# Patient Record
Sex: MALE | Race: WHITE | NOT HISPANIC OR LATINO | URBAN - METROPOLITAN AREA
[De-identification: names, ages, dates, MRNs, and addresses within clinical notes are randomized per-mention and may not be internally consistent; named-entity substitution may affect disease eponyms.]

---

## 2021-01-29 ENCOUNTER — AMBULATORY - HEALTHEAST (OUTPATIENT)
Dept: NURSING | Facility: CLINIC | Age: 26
End: 2021-01-29

## 2021-02-19 ENCOUNTER — AMBULATORY - HEALTHEAST (OUTPATIENT)
Dept: NURSING | Facility: CLINIC | Age: 26
End: 2021-02-19

## 2021-04-20 ASSESSMENT — ENCOUNTER SYMPTOMS
FEVER: 0
HEMATOCHEZIA: 0
PARESTHESIAS: 0
HEADACHES: 0
WEAKNESS: 0
SORE THROAT: 0
COUGH: 0
PALPITATIONS: 0
HEARTBURN: 0
DIZZINESS: 0
CHILLS: 0
ARTHRALGIAS: 0
NAUSEA: 0
FREQUENCY: 0
CONSTIPATION: 0
DIARRHEA: 0
JOINT SWELLING: 0
DYSURIA: 0
EYE PAIN: 0
ABDOMINAL PAIN: 0
HEMATURIA: 0
MYALGIAS: 0
NERVOUS/ANXIOUS: 1
SHORTNESS OF BREATH: 0

## 2021-04-22 ENCOUNTER — OFFICE VISIT (OUTPATIENT)
Dept: FAMILY MEDICINE | Facility: CLINIC | Age: 26
End: 2021-04-22
Payer: COMMERCIAL

## 2021-04-22 VITALS
HEIGHT: 71 IN | DIASTOLIC BLOOD PRESSURE: 68 MMHG | OXYGEN SATURATION: 98 % | RESPIRATION RATE: 14 BRPM | WEIGHT: 184 LBS | BODY MASS INDEX: 25.76 KG/M2 | HEART RATE: 76 BPM | SYSTOLIC BLOOD PRESSURE: 116 MMHG | TEMPERATURE: 97.5 F

## 2021-04-22 DIAGNOSIS — Z23 NEED FOR DIPHTHERIA-TETANUS-PERTUSSIS (TDAP) VACCINE: ICD-10-CM

## 2021-04-22 DIAGNOSIS — Z11.59 NEED FOR HEPATITIS C SCREENING TEST: ICD-10-CM

## 2021-04-22 DIAGNOSIS — Z13.6 CARDIOVASCULAR SCREENING; LDL GOAL LESS THAN 160: ICD-10-CM

## 2021-04-22 DIAGNOSIS — Z00.00 ROUTINE GENERAL MEDICAL EXAMINATION AT A HEALTH CARE FACILITY: Primary | ICD-10-CM

## 2021-04-22 DIAGNOSIS — D72.819 LEUKOPENIA, UNSPECIFIED TYPE: ICD-10-CM

## 2021-04-22 DIAGNOSIS — Z13.0 SCREENING FOR DISORDER OF BLOOD AND BLOOD-FORMING ORGANS: ICD-10-CM

## 2021-04-22 DIAGNOSIS — Z13.1 SCREENING FOR DIABETES MELLITUS (DM): ICD-10-CM

## 2021-04-22 PROBLEM — H91.93 BILATERAL HEARING LOSS: Status: ACTIVE | Noted: 2021-04-22

## 2021-04-22 LAB
ANION GAP SERPL CALCULATED.3IONS-SCNC: 3 MMOL/L (ref 3–14)
BASOPHILS # BLD AUTO: 0 10E9/L (ref 0–0.2)
BASOPHILS NFR BLD AUTO: 0.4 %
BUN SERPL-MCNC: 12 MG/DL (ref 7–30)
CALCIUM SERPL-MCNC: 9.4 MG/DL (ref 8.5–10.1)
CHLORIDE SERPL-SCNC: 106 MMOL/L (ref 94–109)
CHOLEST SERPL-MCNC: 124 MG/DL
CO2 SERPL-SCNC: 29 MMOL/L (ref 20–32)
CREAT SERPL-MCNC: 0.73 MG/DL (ref 0.66–1.25)
DIFFERENTIAL METHOD BLD: ABNORMAL
EOSINOPHIL # BLD AUTO: 0 10E9/L (ref 0–0.7)
EOSINOPHIL NFR BLD AUTO: 1.1 %
ERYTHROCYTE [DISTWIDTH] IN BLOOD BY AUTOMATED COUNT: 11.8 % (ref 10–15)
GFR SERPL CREATININE-BSD FRML MDRD: >90 ML/MIN/{1.73_M2}
GLUCOSE SERPL-MCNC: 93 MG/DL (ref 70–99)
HCT VFR BLD AUTO: 42.4 % (ref 40–53)
HCV AB SERPL QL IA: NONREACTIVE
HDLC SERPL-MCNC: 45 MG/DL
HGB BLD-MCNC: 14.3 G/DL (ref 13.3–17.7)
LDLC SERPL CALC-MCNC: 68 MG/DL
LYMPHOCYTES # BLD AUTO: 1.1 10E9/L (ref 0.8–5.3)
LYMPHOCYTES NFR BLD AUTO: 38.9 %
MCH RBC QN AUTO: 32.5 PG (ref 26.5–33)
MCHC RBC AUTO-ENTMCNC: 33.7 G/DL (ref 31.5–36.5)
MCV RBC AUTO: 96 FL (ref 78–100)
MONOCYTES # BLD AUTO: 0.4 10E9/L (ref 0–1.3)
MONOCYTES NFR BLD AUTO: 12.6 %
NEUTROPHILS # BLD AUTO: 1.3 10E9/L (ref 1.6–8.3)
NEUTROPHILS NFR BLD AUTO: 47 %
NONHDLC SERPL-MCNC: 79 MG/DL
PLATELET # BLD AUTO: 133 10E9/L (ref 150–450)
POTASSIUM SERPL-SCNC: 4 MMOL/L (ref 3.4–5.3)
RBC # BLD AUTO: 4.4 10E12/L (ref 4.4–5.9)
SODIUM SERPL-SCNC: 138 MMOL/L (ref 133–144)
TRIGL SERPL-MCNC: 53 MG/DL
WBC # BLD AUTO: 2.9 10E9/L (ref 4–11)
WBC # BLD AUTO: 2.9 10E9/L (ref 4–11)

## 2021-04-22 PROCEDURE — 36415 COLL VENOUS BLD VENIPUNCTURE: CPT | Performed by: NURSE PRACTITIONER

## 2021-04-22 PROCEDURE — 99395 PREV VISIT EST AGE 18-39: CPT | Mod: 25 | Performed by: NURSE PRACTITIONER

## 2021-04-22 PROCEDURE — 86803 HEPATITIS C AB TEST: CPT | Performed by: NURSE PRACTITIONER

## 2021-04-22 PROCEDURE — 85004 AUTOMATED DIFF WBC COUNT: CPT | Performed by: NURSE PRACTITIONER

## 2021-04-22 PROCEDURE — 85027 COMPLETE CBC AUTOMATED: CPT | Performed by: NURSE PRACTITIONER

## 2021-04-22 PROCEDURE — 90715 TDAP VACCINE 7 YRS/> IM: CPT | Performed by: NURSE PRACTITIONER

## 2021-04-22 PROCEDURE — 90471 IMMUNIZATION ADMIN: CPT | Performed by: NURSE PRACTITIONER

## 2021-04-22 PROCEDURE — 85048 AUTOMATED LEUKOCYTE COUNT: CPT | Performed by: NURSE PRACTITIONER

## 2021-04-22 PROCEDURE — 80048 BASIC METABOLIC PNL TOTAL CA: CPT | Performed by: NURSE PRACTITIONER

## 2021-04-22 PROCEDURE — 80061 LIPID PANEL: CPT | Performed by: NURSE PRACTITIONER

## 2021-04-22 RX ORDER — VALACYCLOVIR HYDROCHLORIDE 1 G/1
TABLET, FILM COATED ORAL
COMMUNITY
Start: 2020-01-01

## 2021-04-22 ASSESSMENT — ENCOUNTER SYMPTOMS
HEADACHES: 0
ABDOMINAL PAIN: 0
SORE THROAT: 0
SHORTNESS OF BREATH: 0
CHILLS: 0
FEVER: 0
WEAKNESS: 0
DIARRHEA: 0
HEMATOCHEZIA: 0
HEMATURIA: 0
HEARTBURN: 0
NERVOUS/ANXIOUS: 1
COUGH: 0
EYE PAIN: 0
PARESTHESIAS: 0
MYALGIAS: 0
DIZZINESS: 0
ARTHRALGIAS: 0
PALPITATIONS: 0
JOINT SWELLING: 0
CONSTIPATION: 0
NAUSEA: 0
DYSURIA: 0
FREQUENCY: 0

## 2021-04-22 ASSESSMENT — MIFFLIN-ST. JEOR: SCORE: 1843.81

## 2021-04-22 NOTE — RESULT ENCOUNTER NOTE
Sharon Johnston,    Attached are your test results.  CBC shows decreased white count and platelets. Would like to repeat in a week or two and if still abnormal would consider referral   -Cholesterol levels (LDL,HDL, Triglycerides) are normal. ADVISE: rechecking in 5 years.  -Kidney function is normal (Cr, GFR), Sodium is normal, Potassium is normal, Calcium is normal, Glucose is normal.    Please contact us if you have any questions.    Maria Fernanda Briseno, CNP

## 2021-04-22 NOTE — PATIENT INSTRUCTIONS
PLAN:   1.   Orders Placed This Encounter   Medications     valACYclovir (VALTREX) 1000 mg tablet     Orders Placed This Encounter   Procedures     TDAP VACCINE (Adacel, Boostrix)  [[id number]]     Lipid panel reflex to direct LDL Fasting     JUST IN CASE     Basic metabolic panel     CBC with platelets     Hepatitis C Screen Reflex to HCV RNA Quant and Genotype     2. Patient needs to follow up in if no improvement,or sooner if worsening of symptoms or other symptoms develop.  Will follow up and/or notify patient of  results via My Chart to determine further need for followup  Follow up office visit in one year for annual health maintenance exam, sooner PRN.

## 2021-04-22 NOTE — PROGRESS NOTES
SUBJECTIVE:   CC: Shane Johnston is an 25 year old male who presents for preventative health visit.     Patient has been advised of split billing requirements and indicates understanding: Yes  Healthy Habits:     Getting at least 3 servings of Calcium per day:  Yes    Bi-annual eye exam:  Yes    Dental care twice a year:  NO    Sleep apnea or symptoms of sleep apnea:  None    Diet:  Vegetarian/vegan    Frequency of exercise:  4-5 days/week    Duration of exercise:  30-45 minutes    Taking medications regularly:  Not Applicable    Medication side effects:  Not applicable    PHQ-2 Total Score: 0    Additional concerns today:  No      Today's PHQ-2 Score:   PHQ-2 ( 1999 [employer]) 4/20/2021   Q1: Little interest or pleasure in doing things 0   Q2: Feeling down, depressed or hopeless 0   PHQ-2 Score 0   Q1: Little interest or pleasure in doing things Not at all   Q2: Feeling down, depressed or hopeless Not at all   PHQ-2 Score 0       Abuse: Current or Past(Physical, Sexual or Emotional)- No  Do you feel safe in your environment? Yes    Social History     Tobacco Use     Smoking status: Never Smoker     Smokeless tobacco: Never Used   Substance Use Topics     Alcohol use: Yes     Comment: occasionally, 1-2x monthly     If you drink alcohol do you typically have >3 drinks per day or >7 drinks per week? No    Alcohol Use 4/20/2021   Prescreen: >3 drinks/day or >7 drinks/week? No       Last PSA: No results found for: PSA    Reviewed orders with patient. Reviewed health maintenance and updated orders accordingly - Yes  Labs reviewed in EPIC  BP Readings from Last 3 Encounters:   04/22/21 116/68    Wt Readings from Last 3 Encounters:   04/22/21 83.5 kg (184 lb)                  Patient Active Problem List   Diagnosis     Bilateral hearing loss     No past surgical history on file.    Social History     Tobacco Use     Smoking status: Never Smoker     Smokeless tobacco: Never Used   Substance Use Topics     Alcohol use: Yes      Comment: occasionally, 1-2x monthly     Family History   Problem Relation Age of Onset     Diabetes Mother      Cerebrovascular Disease Paternal Grandfather      Coronary Artery Disease Paternal Grandfather      Lung Cancer Paternal Grandfather      Lung Cancer Father      Asthma Maternal Grandmother      Dementia Paternal Grandmother      Hypertension No family hx of      Hyperlipidemia No family hx of      Thrombosis No family hx of      Thyroid Disease No family hx of      Breast Cancer No family hx of      Ovarian Cancer No family hx of      Uterine Cancer No family hx of      Prostate Cancer No family hx of      Colorectal Cancer No family hx of      Pancreatic Cancer No family hx of      Melanoma No family hx of          Current Outpatient Medications   Medication Sig Dispense Refill     valACYclovir (VALTREX) 1000 mg tablet        No Known Allergies    Reviewed and updated as needed this visit by clinical staff   Allergies  Meds              Reviewed and updated as needed this visit by Provider                Past Medical History:   Diagnosis Date     Congenital hearing disorder of both ears      Depression with anxiety appx. 2018    stable, off treatment      No past surgical history on file.    Review of Systems   Constitutional: Negative for chills and fever.   HENT: Negative for congestion, ear pain, hearing loss and sore throat.    Eyes: Negative for pain and visual disturbance.   Respiratory: Negative for cough and shortness of breath.    Cardiovascular: Negative for chest pain, palpitations and peripheral edema.   Gastrointestinal: Negative for abdominal pain, constipation, diarrhea, heartburn, hematochezia and nausea.   Genitourinary: Negative for discharge, dysuria, frequency, genital sores, hematuria, impotence and urgency.   Musculoskeletal: Negative for arthralgias, joint swelling and myalgias.   Skin: Negative for rash.   Neurological: Negative for dizziness, weakness, headaches and  "paresthesias.   Psychiatric/Behavioral: Negative for mood changes. The patient is nervous/anxious.         Was on medication in 2017 but only on for a few months   Had a lot of side effects with the medication Lexapro   Was in counseling but feels like doing OK now      OBJECTIVE:   /68   Pulse 76   Temp 97.5  F (36.4  C) (Tympanic)   Resp 14   Ht 1.791 m (5' 10.5\")   Wt 83.5 kg (184 lb)   SpO2 98%   BMI 26.03 kg/m      Physical Exam  GENERAL: healthy, alert and no distress  EYES: Eyes grossly normal to inspection and conjunctivae and sclerae normal  HENT: ear canals and TM's normal, nose and mouth without ulcers or lesions  NECK: no adenopathy, no asymmetry, masses, or scars and thyroid normal to palpation  RESP: lungs clear to auscultation - no rales, rhonchi or wheezes  CV: regular rates and rhythm, no murmur, click or rub, peripheral pulses strong and no peripheral edema  ABDOMEN: soft, nontender, no hepatosplenomegaly, no masses and bowel sounds normal   (male): normal male genitalia without lesions or urethral discharge, no hernia  MS: no gross musculoskeletal defects noted, no edema  SKIN: no suspicious lesions or rashes  NEURO: Normal strength and tone, mentation intact and speech normal  PSYCH: mentation appears normal, affect normal/bright  LYMPH: no cervical, supraclavicular, axillary, or inguinal adenopathy    Diagnostic Test Results:  Labs reviewed in Epic  Results for orders placed or performed in visit on 04/22/21   Lipid panel reflex to direct LDL Fasting     Status: None   Result Value Ref Range    Cholesterol 124 <200 mg/dL    Triglycerides 53 <150 mg/dL    HDL Cholesterol 45 >39 mg/dL    LDL Cholesterol Calculated 68 <100 mg/dL    Non HDL Cholesterol 79 <130 mg/dL   Basic metabolic panel     Status: None   Result Value Ref Range    Sodium 138 133 - 144 mmol/L    Potassium 4.0 3.4 - 5.3 mmol/L    Chloride 106 94 - 109 mmol/L    Carbon Dioxide 29 20 - 32 mmol/L    Anion Gap 3 3 - 14 " mmol/L    Glucose 93 70 - 99 mg/dL    Urea Nitrogen 12 7 - 30 mg/dL    Creatinine 0.73 0.66 - 1.25 mg/dL    GFR Estimate >90 >60 mL/min/[1.73_m2]    GFR Estimate If Black >90 >60 mL/min/[1.73_m2]    Calcium 9.4 8.5 - 10.1 mg/dL   CBC with platelets     Status: Abnormal   Result Value Ref Range    WBC 2.9 (L) 4.0 - 11.0 10e9/L    RBC Count 4.40 4.4 - 5.9 10e12/L    Hemoglobin 14.3 13.3 - 17.7 g/dL    Hematocrit 42.4 40.0 - 53.0 %    MCV 96 78 - 100 fl    MCH 32.5 26.5 - 33.0 pg    MCHC 33.7 31.5 - 36.5 g/dL    RDW 11.8 10.0 - 15.0 %    Platelet Count 133 (L) 150 - 450 10e9/L   Hepatitis C Screen Reflex to HCV RNA Quant and Genotype     Status: None   Result Value Ref Range    Hepatitis C Antibody Nonreactive NR^Nonreactive   WBC with Diff     Status: Abnormal   Result Value Ref Range    WBC 2.9 (L) 4.0 - 11.0 10e9/L    % Neutrophils 47.0 %    % Lymphocytes 38.9 %    % Monocytes 12.6 %    % Eosinophils 1.1 %    % Basophils 0.4 %    Absolute Neutrophil 1.3 (L) 1.6 - 8.3 10e9/L    Absolute Lymphocytes 1.1 0.8 - 5.3 10e9/L    Absolute Monocytes 0.4 0.0 - 1.3 10e9/L    Absolute Eosinophils 0.0 0.0 - 0.7 10e9/L    Absolute Basophils 0.0 0.0 - 0.2 10e9/L    Diff Method Automated Method        ASSESSMENT/PLAN:   Shane was seen today for physical.    Diagnoses and all orders for this visit:    Routine general medical examination at a health care facility    CARDIOVASCULAR SCREENING; LDL GOAL LESS THAN 160  -     Lipid panel reflex to direct LDL Fasting    Screening for diabetes mellitus (DM)  -     JUST IN CASE  -     Basic metabolic panel    Screening for disorder of blood and blood-forming organs  -     CBC with platelets    Need for hepatitis C screening test  -     Hepatitis C Screen Reflex to HCV RNA Quant and Genotype    Need for diphtheria-tetanus-pertussis (Tdap) vaccine  -     TDAP VACCINE (Adacel, Boostrix)  [[id number]]    Leukopenia, unspecified type  -     Cancel: WBC Differential  -     CBC with platelets  "differential; Future  -     WBC with Diff    PLAN:   Patient needs to follow up in if no improvement,or sooner if worsening of symptoms or other symptoms develop.  Will follow up and/or notify patient of  results via My Chart to determine further need for followup  Follow up office visit in one year for annual health maintenance exam, sooner PRN.      Patient has been advised of split billing requirements and indicates understanding: Yes  COUNSELING:   Reviewed preventive health counseling, as reflected in patient instructions  Special attention given to:        Regular exercise       Healthy diet/nutrition       Vision screening       Safe sex practices/STD prevention       Consider Hep C screening for all patients one time for ages 18-79 years       HIV screeninx in teen years, 1x in adult years, and at intervals if high risk    Estimated body mass index is 26.03 kg/m  as calculated from the following:    Height as of this encounter: 1.791 m (5' 10.5\").    Weight as of this encounter: 83.5 kg (184 lb).     Weight management plan: Discussed healthy diet and exercise guidelines    He reports that he has never smoked. He has never used smokeless tobacco.      Counseling Resources:  ATP IV Guidelines  Pooled Cohorts Equation Calculator  FRAX Risk Assessment  ICSI Preventive Guidelines  Dietary Guidelines for Americans, 2010  USDA's MyPlate  ASA Prophylaxis  Lung CA Screening    ARLINE Merchant [facility]  "

## 2021-04-30 DIAGNOSIS — D72.819 LEUKOPENIA, UNSPECIFIED TYPE: ICD-10-CM

## 2021-04-30 LAB
BASOPHILS # BLD AUTO: 0 10E9/L (ref 0–0.2)
BASOPHILS NFR BLD AUTO: 0.5 %
DIFFERENTIAL METHOD BLD: NORMAL
EOSINOPHIL # BLD AUTO: 0.1 10E9/L (ref 0–0.7)
EOSINOPHIL NFR BLD AUTO: 1.3 %
ERYTHROCYTE [DISTWIDTH] IN BLOOD BY AUTOMATED COUNT: 12 % (ref 10–15)
HCT VFR BLD AUTO: 43.4 % (ref 40–53)
HGB BLD-MCNC: 14.9 G/DL (ref 13.3–17.7)
LYMPHOCYTES # BLD AUTO: 1.8 10E9/L (ref 0.8–5.3)
LYMPHOCYTES NFR BLD AUTO: 45.5 %
MCH RBC QN AUTO: 32.7 PG (ref 26.5–33)
MCHC RBC AUTO-ENTMCNC: 34.3 G/DL (ref 31.5–36.5)
MCV RBC AUTO: 95 FL (ref 78–100)
MONOCYTES # BLD AUTO: 0.4 10E9/L (ref 0–1.3)
MONOCYTES NFR BLD AUTO: 10.8 %
NEUTROPHILS # BLD AUTO: 1.7 10E9/L (ref 1.6–8.3)
NEUTROPHILS NFR BLD AUTO: 41.9 %
PLATELET # BLD AUTO: 153 10E9/L (ref 150–450)
RBC # BLD AUTO: 4.56 10E12/L (ref 4.4–5.9)
WBC # BLD AUTO: 4 10E9/L (ref 4–11)

## 2021-04-30 PROCEDURE — 36415 COLL VENOUS BLD VENIPUNCTURE: CPT | Performed by: NURSE PRACTITIONER

## 2021-04-30 PROCEDURE — 85025 COMPLETE CBC W/AUTO DIFF WBC: CPT | Performed by: NURSE PRACTITIONER

## 2021-04-30 NOTE — RESULT ENCOUNTER NOTE
Sharon Johnston,    Attached are your test results.  -Normal red blood cell (hgb) levels, normal white blood cell count and normal platelet levels.   Please contact us if you have any questions.    Maria Fernanda Briseno, CNP

## 2021-07-09 ENCOUNTER — VIRTUAL VISIT (OUTPATIENT)
Dept: FAMILY MEDICINE | Facility: CLINIC | Age: 26
End: 2021-07-09
Payer: COMMERCIAL

## 2021-07-09 ENCOUNTER — E-VISIT (OUTPATIENT)
Dept: FAMILY MEDICINE | Facility: CLINIC | Age: 26
End: 2021-07-09
Payer: COMMERCIAL

## 2021-07-09 DIAGNOSIS — F43.23 ADJUSTMENT DISORDER WITH MIXED ANXIETY AND DEPRESSED MOOD: Primary | ICD-10-CM

## 2021-07-09 DIAGNOSIS — F32.A DEPRESSION, UNSPECIFIED DEPRESSION TYPE: Primary | ICD-10-CM

## 2021-07-09 DIAGNOSIS — F41.0 PANIC ATTACK: ICD-10-CM

## 2021-07-09 PROCEDURE — 99207 PR NON-BILLABLE SERV PER CHARTING: CPT | Performed by: NURSE PRACTITIONER

## 2021-07-09 PROCEDURE — 99213 OFFICE O/P EST LOW 20 MIN: CPT | Mod: 95 | Performed by: NURSE PRACTITIONER

## 2021-07-09 RX ORDER — HYDROXYZINE HYDROCHLORIDE 25 MG/1
25-50 TABLET, FILM COATED ORAL EVERY 8 HOURS PRN
Qty: 30 TABLET | Refills: 1 | Status: SHIPPED | OUTPATIENT
Start: 2021-07-09

## 2021-07-09 ASSESSMENT — ANXIETY QUESTIONNAIRES
GAD7 TOTAL SCORE: 11
4. TROUBLE RELAXING: SEVERAL DAYS
GAD7 TOTAL SCORE: 11
GAD7 TOTAL SCORE: 11
4. TROUBLE RELAXING: MORE THAN HALF THE DAYS
1. FEELING NERVOUS, ANXIOUS, OR ON EDGE: SEVERAL DAYS
2. NOT BEING ABLE TO STOP OR CONTROL WORRYING: SEVERAL DAYS
2. NOT BEING ABLE TO STOP OR CONTROL WORRYING: SEVERAL DAYS
1. FEELING NERVOUS, ANXIOUS, OR ON EDGE: SEVERAL DAYS
3. WORRYING TOO MUCH ABOUT DIFFERENT THINGS: MORE THAN HALF THE DAYS
3. WORRYING TOO MUCH ABOUT DIFFERENT THINGS: SEVERAL DAYS
GAD7 TOTAL SCORE: 11
7. FEELING AFRAID AS IF SOMETHING AWFUL MIGHT HAPPEN: MORE THAN HALF THE DAYS
5. BEING SO RESTLESS THAT IT IS HARD TO SIT STILL: SEVERAL DAYS
7. FEELING AFRAID AS IF SOMETHING AWFUL MIGHT HAPPEN: MORE THAN HALF THE DAYS
6. BECOMING EASILY ANNOYED OR IRRITABLE: NEARLY EVERY DAY
6. BECOMING EASILY ANNOYED OR IRRITABLE: MORE THAN HALF THE DAYS
GAD7 TOTAL SCORE: 11
5. BEING SO RESTLESS THAT IT IS HARD TO SIT STILL: MORE THAN HALF THE DAYS
7. FEELING AFRAID AS IF SOMETHING AWFUL MIGHT HAPPEN: MORE THAN HALF THE DAYS
7. FEELING AFRAID AS IF SOMETHING AWFUL MIGHT HAPPEN: MORE THAN HALF THE DAYS

## 2021-07-09 NOTE — PATIENT INSTRUCTIONS
PLAN:   1.   Symptomatic therapy suggested: will start on zoloft half tablet a day for a week and then increase to a whole tablet a day.  Hydroxyzine as needed for panic attack     2.  Orders Placed This Encounter   Medications     sertraline (ZOLOFT) 50 MG tablet     Sig: Take 1 tablet (50 mg) by mouth daily     Dispense:  30 tablet     Refill:  1     hydrOXYzine (ATARAX) 25 MG tablet     Sig: Take 1-2 tablets (25-50 mg) by mouth every 8 hours as needed for anxiety     Dispense:  30 tablet     Refill:  1       3. Patient needs to follow up in if no improvement,or sooner if worsening of symptoms or other symptoms develop.  Initiated consultation with JOEL Baker  for mental health counseling.   Follow up in 1 month.

## 2021-07-09 NOTE — PROGRESS NOTES
Shane is a 25 year old who is being evaluated via a billable video visit.      How would you like to obtain your AVS? MyChart  If the video visit is dropped, the invitation should be resent by: Text to cell phone:  Will anyone else be joining your video visit? No      Video Start Time: 2:47 PM      Subjective   Shane is a 25 year old who presents for the following health issues     HPI     Abnormal Mood Symptoms  Onset/Duration: hx of anxiety and depression and had been on Lexapro in the past   Did not like the side effects so quit it. Has not been on medications since 2017   Not seen a counselor since 2018 in [place]   Description:   Depression (if yes, do PHQ-9): YES  Anxiety (if yes, do KISHOR-7): YES  Accompanying Signs & Symptoms:  Still participating in activities that you used to enjoy: YES  Fatigue: YES  Irritability: YES  Difficulty concentrating: YES  Changes in appetite: is eating   Problems with sleep: YES  Heart racing/beating fast: no  Abnormally elevated, expansive, or irritable mood: no  Persistently increased activity or energy: no  Thoughts of hurting yourself or others: no  History:  Recent stress or major life event: currently submitting applications to [employer] school   Prior depression or anxiety: yes in 2017   Family history of depression or anxiety: YES- GM and mother  Alcohol/drug use: no  Difficulty sleeping: no  Precipitating or alleviating factors: work stress   Therapies tried and outcome: none  No flowsheet data found.  KISHOR-7 SCORE 7/9/2021 7/9/2021   Total Score 11 (moderate anxiety) 11 (moderate anxiety)   Total Score 11 11       Labs reviewed in EPIC  BP Readings from Last 3 Encounters:   04/22/21 116/68    Wt Readings from Last 3 Encounters:   04/22/21 83.5 kg (184 lb)                  Patient Active Problem List   Diagnosis     Bilateral hearing loss     No past surgical history on file.    Social History     Tobacco Use     Smoking status: Never Smoker     Smokeless tobacco: Never Used    Substance Use Topics     Alcohol use: Yes     Comment: occasionally, 1-2x monthly     Family History   Problem Relation Age of Onset     Diabetes Mother      Cerebrovascular Disease Paternal Grandfather      Coronary Artery Disease Paternal Grandfather      Lung Cancer Paternal Grandfather      Lung Cancer Father      Asthma Maternal Grandmother      Dementia Paternal Grandmother      Hypertension No family hx of      Hyperlipidemia No family hx of      Thrombosis No family hx of      Thyroid Disease No family hx of      Breast Cancer No family hx of      Ovarian Cancer No family hx of      Uterine Cancer No family hx of      Prostate Cancer No family hx of      Colorectal Cancer No family hx of      Pancreatic Cancer No family hx of      Melanoma No family hx of          Current Outpatient Medications   Medication Sig Dispense Refill     valACYclovir (VALTREX) 1000 mg tablet        No Known Allergies      Review of Systems   Constitutional, HEENT, cardiovascular, pulmonary, gi and gu systems are negative, except as otherwise noted.      Objective           Vitals:  No vitals were obtained today due to virtual visit.    Physical Exam   GENERAL: Healthy, alert and no distress  EYES: Eyes grossly normal to inspection and conjunctivae and sclerae normal  HENT: normal cephalic/atraumatic and oral mucous membranes moist  RESP: No audible wheeze, cough, or visible cyanosis.  No visible retractions or increased work of breathing.    MS: extremities normal- no gross deformities noted  SKIN: Visible skin clear. No significant rash, abnormal pigmentation or lesions.  NEURO: mentation intact  PSYCH: Mentation appears normal, affect normal/bright, judgement and insight intact, normal speech and appearance well-groomed.    Diagnostic Test Results:  Labs reviewed in Epic  none   '  Assessment & Plan     Adjustment disorder with mixed anxiety and depressed mood  Initiate medication with Zoloft  50 mg q day  Reviewed concept of  depression as function of biochemical imbalance of neurotransmitters/rationale for treatment.  Risks and benefits of medication(s) reviewed with patient.  Questions answered.  Counseling advised  Followup appointment in 1 month(s)  Patient instructed to call for significant side effects medications or problems  Patient advised immediate presentation to hospital for suicidal thought, etc.  Will Start:  - sertraline (ZOLOFT) 50 MG tablet  Dispense: 30 tablet; Refill: 1    Panic attack  Discussed the pathophysiology of anxiety episodes and the various symptoms seen associated with anxiety episodes.  Discussed possible triggers including fatigue, depression, stress, and chemicals such as alcohol, caffeine and certain drugs.  Discussed the treatment including an aerobic exercise program, adequate rest, and both rescue meds and maintenance meds.'  Will Start:'  - hydrOXYzine (ATARAX) 25 MG tablet  Dispense: 30 tablet; Refill: 1    }  See Patient Instructions  Patient Instructions     PLAN:   1.   Symptomatic therapy suggested: will start on zoloft half tablet a day for a week and then increase to a whole tablet a day.  Hydroxyzine as needed for panic attack     2.  Orders Placed This Encounter   Medications     sertraline (ZOLOFT) 50 MG tablet     Sig: Take 1 tablet (50 mg) by mouth daily     Dispense:  30 tablet     Refill:  1     hydrOXYzine (ATARAX) 25 MG tablet     Sig: Take 1-2 tablets (25-50 mg) by mouth every 8 hours as needed for anxiety     Dispense:  30 tablet     Refill:  1       3. Patient needs to follow up in if no improvement,or sooner if worsening of symptoms or other symptoms develop.  Initiated consultation with JOEL Baker  for mental health counseling.   Follow up in 1 month.    No follow-ups on file.    ARLINE Merchant [facility]          Video-Visit Details    Type of service:  Video Visit    Video End Time:3:09 PM    Originating Location (pt. Location):  Home    Distant Location (provider location):  [facility]     Platform used for Video Visit: Home    Answers for HPI/ROS submitted by the patient on 7/9/2021   Chronic problems general questions HPI Form  How many servings of fruits and vegetables do you eat daily?: 4 or more  On average, how many sweetened beverages do you drink each day (Examples: soda, juice, sweet tea, etc.  Do NOT count diet or artificially sweetened beverages)?: 1  How many minutes a day do you exercise enough to make your heart beat faster?: 30 to 60  How many days a week do you exercise enough to make your heart beat faster?: 5  How many days per week do you miss taking your medication?: 0  Depression/Anxiety: Anxiety  Anxiety since last: : bad  Other associated symotome: : Yes  Significant life event: : other  Anxious:: Yes  Current substance use:: No  KISHOR 7 TOTAL SCORE: 11

## 2021-07-09 NOTE — Clinical Note
Jackson Hoyt   Please reach out to Shane   More anxiety I believe than depression   Thanks Hemalatha

## 2021-07-10 ASSESSMENT — ANXIETY QUESTIONNAIRES
GAD7 TOTAL SCORE: 11
GAD7 TOTAL SCORE: 11

## 2021-07-14 ENCOUNTER — TELEPHONE (OUTPATIENT)
Dept: BEHAVIORAL HEALTH | Facility: CLINIC | Age: 26
End: 2021-07-14

## 2021-07-14 NOTE — TELEPHONE ENCOUNTER
Reached out to pt to offer [facility] appt per the request of ARLINE Ramirez .Left voicemail with behavioral intakes number for scheduling.

## 2021-08-09 ENCOUNTER — VIRTUAL VISIT (OUTPATIENT)
Dept: FAMILY MEDICINE | Facility: CLINIC | Age: 26
End: 2021-08-09
Payer: COMMERCIAL

## 2021-08-09 DIAGNOSIS — F43.22 ADJUSTMENT DISORDER WITH ANXIOUS MOOD: Primary | ICD-10-CM

## 2021-08-09 DIAGNOSIS — F41.0 PANIC ATTACK: ICD-10-CM

## 2021-08-09 PROCEDURE — 99213 OFFICE O/P EST LOW 20 MIN: CPT | Mod: 95 | Performed by: NURSE PRACTITIONER

## 2021-08-09 RX ORDER — BUSPIRONE HYDROCHLORIDE 5 MG/1
TABLET ORAL
Qty: 63 TABLET | Refills: 1 | Status: SHIPPED | OUTPATIENT
Start: 2021-08-09 | End: 2021-09-20

## 2021-08-09 ASSESSMENT — ANXIETY QUESTIONNAIRES
8. IF YOU CHECKED OFF ANY PROBLEMS, HOW DIFFICULT HAVE THESE MADE IT FOR YOU TO DO YOUR WORK, TAKE CARE OF THINGS AT HOME, OR GET ALONG WITH OTHER PEOPLE?: NOT DIFFICULT AT ALL
GAD7 TOTAL SCORE: 2
7. FEELING AFRAID AS IF SOMETHING AWFUL MIGHT HAPPEN: NOT AT ALL
GAD7 TOTAL SCORE: 2
4. TROUBLE RELAXING: NOT AT ALL
1. FEELING NERVOUS, ANXIOUS, OR ON EDGE: SEVERAL DAYS
GAD7 TOTAL SCORE: 2
5. BEING SO RESTLESS THAT IT IS HARD TO SIT STILL: NOT AT ALL
2. NOT BEING ABLE TO STOP OR CONTROL WORRYING: NOT AT ALL
6. BECOMING EASILY ANNOYED OR IRRITABLE: SEVERAL DAYS
3. WORRYING TOO MUCH ABOUT DIFFERENT THINGS: NOT AT ALL
7. FEELING AFRAID AS IF SOMETHING AWFUL MIGHT HAPPEN: NOT AT ALL

## 2021-08-09 NOTE — PROGRESS NOTES
Shane is a 25 year old who is being evaluated via a billable video visit.      How would you like to obtain your AVS? MyChart  If the video visit is dropped, the invitation should be resent by: [phone]    Will anyone else be joining your video visit? No      Video Start Time: 5:21 PM        Subjective   Shane is a 25 year old who presents for the following health issues     History of Present Illness       Mental Health Follow-up:  Patient presents to follow-up on Anxiety.    Patient's anxiety since last visit has been:  Better  The patient is not having other symptoms associated with anxiety.  Any significant life events: No  Patient is not feeling anxious or having panic attacks.  Patient has no concerns about alcohol or drug use.     Social History  Tobacco Use    Smoking status: Never Smoker    Smokeless tobacco: Never Used  Alcohol use: Yes    Comment: occasionally, 1-2x monthly  Drug use: Never      Today's PHQ-9         PHQ-9 Total Score:         PHQ-9 Q9 Thoughts of better off dead/self-harm past 2 weeks :       Thoughts of suicide or self harm:      Self-harm Plan:        Self-harm Action:          Safety concerns for self or others:           He eats 4 or more servings of fruits and vegetables daily.He consumes 1 sweetened beverage(s) daily.He exercises with enough effort to increase his heart rate 30 to 60 minutes per day.  He exercises with enough effort to increase his heart rate 5 days per week.   He is taking medications regularly.       Depression and Anxiety Follow-Up    How are you doing with your depression since your last visit? Improved but having jaw clenching with zoloft     How are you doing with your anxiety since your last visit?  Improved     Are you having other symptoms that might be associated with depression or anxiety? No    Have you had a significant life event? Job Concerns     Do you have any concerns with your use of alcohol or other drugs? No  Has been on the zoloft now for  about a month noticed jaw clenching   Did not have this before starting the Zoloft   Tried lexapro as well in the past which he did not like   Primary issue has been anxiety and would like to try something else     Social History     Tobacco Use     Smoking status: Never Smoker     Smokeless tobacco: Never Used   Substance Use Topics     Alcohol use: Yes     Comment: occasionally, 1-2x monthly     Drug use: Never     No flowsheet data found.  KISHOR-7 SCORE 7/9/2021 7/9/2021 8/9/2021   Total Score 11 (moderate anxiety) 11 (moderate anxiety) 2 (minimal anxiety)   Total Score 11 11 2     No flowsheet data found.  KISHOR-7  8/9/2021   1. Feeling nervous, anxious, or on edge 1   2. Not being able to stop or control worrying 0   3. Worrying too much about different things 0   4. Trouble relaxing 0   5. Being so restless that it is hard to sit still 0   6. Becoming easily annoyed or irritable 1   7. Feeling afraid, as if something awful might happen 0   KISHOR-7 Total Score 2   No flowsheet data found.      Suicide Assessment Five-step Evaluation and Treatment (SAFE-T)      Labs reviewed in EPIC  BP Readings from Last 3 Encounters:   04/22/21 116/68    Wt Readings from Last 3 Encounters:   04/22/21 83.5 kg (184 lb)              Patient Active Problem List   Diagnosis     Bilateral hearing loss     No past surgical history on file.    Social History     Tobacco Use     Smoking status: Never Smoker     Smokeless tobacco: Never Used   Substance Use Topics     Alcohol use: Yes     Comment: occasionally, 1-2x monthly     Family History   Problem Relation Age of Onset     Diabetes Mother      Cerebrovascular Disease Paternal Grandfather      Coronary Artery Disease Paternal Grandfather      Lung Cancer Paternal Grandfather      Lung Cancer Father      Asthma Maternal Grandmother      Dementia Paternal Grandmother      Hypertension No family hx of      Hyperlipidemia No family hx of      Thrombosis No family hx of      Thyroid Disease No  family hx of      Breast Cancer No family hx of      Ovarian Cancer No family hx of      Uterine Cancer No family hx of      Prostate Cancer No family hx of      Colorectal Cancer No family hx of      Pancreatic Cancer No family hx of      Melanoma No family hx of          Current Outpatient Medications   Medication Sig Dispense Refill     busPIRone (BUSPAR) 5 MG tablet Take 1 tablet (5 mg) by mouth 2 times daily for 7 days, THEN 1.5 tablets (7.5 mg) 2 times daily for 7 days, THEN 2 tablets (10 mg) 2 times daily for 7 days. 63 tablet 1     hydrOXYzine (ATARAX) 25 MG tablet Take 1-2 tablets (25-50 mg) by mouth every 8 hours as needed for anxiety 30 tablet 1     sertraline (ZOLOFT) 50 MG tablet Take 1 tablet (50 mg) by mouth daily 30 tablet 1     valACYclovir (VALTREX) 1000 mg tablet        No Known Allergies    Review of Systems   Constitutional, HEENT, cardiovascular, pulmonary, gi and gu systems are negative, except as otherwise noted.      Objective           Vitals:  No vitals were obtained today due to virtual visit.    Physical Exam   GENERAL: Healthy, alert and no distress  EYES: Eyes grossly normal to inspection and conjunctivae and sclerae normal  HENT: normal cephalic/atraumatic and oral mucous membranes moist  RESP: No audible wheeze, cough, or visible cyanosis.  No visible retractions or increased work of breathing.    MS: extremities normal- no gross deformities noted  SKIN: Visible skin clear. No significant rash, abnormal pigmentation or lesions.  NEURO: mentation intact  PSYCH: Mentation appears normal, affect normal/bright, judgement and insight intact, normal speech and appearance well-groomed.    Diagnostic Test Results:  Labs reviewed in Epic  none       Assessment & Plan     Adjustment disorder with anxious mood  Discontinue Zoloft   Initiate medication with Buspar   Risks and benefits of medication(s) reviewed with patient.  Questions answered.  Counseling advised  Followup appointment in 1  month(s)  Patient instructed to call for significant side effects medications or problems  Patient advised immediate presentation to hospital for suicidal thought, etc.  Will Start:  - busPIRone (BUSPAR) 5 MG tablet  Dispense: 63 tablet; Refill: 1    Panic attack  Discussed the pathophysiology of anxiety episodes and the various symptoms seen associated with anxiety episodes.  Discussed possible triggers including fatigue, depression, stress, and chemicals such as alcohol, caffeine and certain drugs.  Discussed the treatment including an aerobic exercise program, adequate rest, and both rescue meds and maintenance meds.'           See Patient Instructions  Patient Instructions     PLAN:   1.   Symptomatic therapy suggested: will wean off the zoloft in the next week and will start on the Buspar.  2.  Orders Placed This Encounter   Medications     busPIRone (BUSPAR) 5 MG tablet     Sig: Take 1 tablet (5 mg) by mouth 2 times daily for 7 days, THEN 1.5 tablets (7.5 mg) 2 times daily for 7 days, THEN 2 tablets (10 mg) 2 times daily for 7 days.     Dispense:  63 tablet     Refill:  1     3. Patient needs to follow up in if no improvement,or sooner if worsening of symptoms or other symptoms develop.  Follow up in 1 month.      No follow-ups on file.    ARLINE Merchant CNP  [facility]          Video-Visit Details    Type of service:  Video Visit    Video End Time: 5:42 PM    Originating Location (pt. Location): Home    Distant Location (provider location):  [facility]     Platform used for Video Visit: Doximity    Answers for HPI/ROS submitted by the patient on 8/9/2021  KISHOR 7 TOTAL SCORE: 2  Depression/Anxiety: Anxiety  Anxiety since last: : better  Other associated symotome: : No  Significant life event: : No  Anxious:: No  Current substance use:: No  How many servings of fruits and vegetables do you eat daily?: 4 or more  On average, how many sweetened beverages  do you drink each day (Examples: soda, juice, sweet tea, etc.  Do NOT count diet or artificially sweetened beverages)?: 1  How many minutes a day do you exercise enough to make your heart beat faster?: 30 to 60  How many days a week do you exercise enough to make your heart beat faster?: 5  How many days per week do you miss taking your medication?: 0

## 2021-08-09 NOTE — PATIENT INSTRUCTIONS
PLAN:   1.   Symptomatic therapy suggested: will wean off the zoloft in the next week and will start on the Buspar.  2.  Orders Placed This Encounter   Medications     busPIRone (BUSPAR) 5 MG tablet     Sig: Take 1 tablet (5 mg) by mouth 2 times daily for 7 days, THEN 1.5 tablets (7.5 mg) 2 times daily for 7 days, THEN 2 tablets (10 mg) 2 times daily for 7 days.     Dispense:  63 tablet     Refill:  1     3. Patient needs to follow up in if no improvement,or sooner if worsening of symptoms or other symptoms develop.  Follow up in 1 month.

## 2021-08-10 ASSESSMENT — ANXIETY QUESTIONNAIRES: GAD7 TOTAL SCORE: 2

## 2021-09-18 ASSESSMENT — ANXIETY QUESTIONNAIRES
8. IF YOU CHECKED OFF ANY PROBLEMS, HOW DIFFICULT HAVE THESE MADE IT FOR YOU TO DO YOUR WORK, TAKE CARE OF THINGS AT HOME, OR GET ALONG WITH OTHER PEOPLE?: NOT DIFFICULT AT ALL
2. NOT BEING ABLE TO STOP OR CONTROL WORRYING: NOT AT ALL
4. TROUBLE RELAXING: NOT AT ALL
7. FEELING AFRAID AS IF SOMETHING AWFUL MIGHT HAPPEN: NOT AT ALL
7. FEELING AFRAID AS IF SOMETHING AWFUL MIGHT HAPPEN: NOT AT ALL
1. FEELING NERVOUS, ANXIOUS, OR ON EDGE: NOT AT ALL
GAD7 TOTAL SCORE: 1
3. WORRYING TOO MUCH ABOUT DIFFERENT THINGS: NOT AT ALL
GAD7 TOTAL SCORE: 1
GAD7 TOTAL SCORE: 1
5. BEING SO RESTLESS THAT IT IS HARD TO SIT STILL: NOT AT ALL
6. BECOMING EASILY ANNOYED OR IRRITABLE: SEVERAL DAYS

## 2021-09-19 ASSESSMENT — ANXIETY QUESTIONNAIRES: GAD7 TOTAL SCORE: 1

## 2021-09-20 ENCOUNTER — VIRTUAL VISIT (OUTPATIENT)
Dept: FAMILY MEDICINE | Facility: CLINIC | Age: 26
End: 2021-09-20
Payer: COMMERCIAL

## 2021-09-20 DIAGNOSIS — F32.A MODERATELY SEVERE DEPRESSION: Primary | ICD-10-CM

## 2021-09-20 DIAGNOSIS — F43.22 ADJUSTMENT DISORDER WITH ANXIOUS MOOD: ICD-10-CM

## 2021-09-20 PROCEDURE — 99213 OFFICE O/P EST LOW 20 MIN: CPT | Mod: 95 | Performed by: NURSE PRACTITIONER

## 2021-09-20 RX ORDER — BUSPIRONE HYDROCHLORIDE 10 MG/1
10 TABLET ORAL 2 TIMES DAILY
Qty: 180 TABLET | Refills: 3 | Status: SHIPPED | OUTPATIENT
Start: 2021-09-20

## 2021-09-20 NOTE — PATIENT INSTRUCTIONS
PLAN:   1.   Symptomatic therapy suggested: continue on Buspar.  2.  Orders Placed This Encounter   Medications     busPIRone (BUSPAR) 10 MG tablet     Sig: Take 1 tablet (10 mg) by mouth 2 times daily     Dispense:  180 tablet     Refill:  3         3. Patient needs to follow up in if no improvement,or sooner if worsening of symptoms or other symptoms develop.  Follow up in 6 months.

## 2021-09-20 NOTE — PROGRESS NOTES
Shane is a 26 year old who is being evaluated via a billable video visit.      How would you like to obtain your AVS? MyChart  If the video visit is dropped, the invitation should be resent by: Text to cell phone:   Will anyone else be joining your video visit? No      Video Start Time: 5:20 PM        Subjective   Shane is a 26 year old who presents for the following health issues     History of Present Illness       Mental Health Follow-up:  Patient presents to follow-up on Anxiety.    Patient's anxiety since last visit has been:  Better  The patient is not having other symptoms associated with anxiety.  Any significant life events: No  Patient is not feeling anxious or having panic attacks.  Patient has no concerns about alcohol or drug use.     Social History  Tobacco Use    Smoking status: Never Smoker    Smokeless tobacco: Never Used  Alcohol use: Yes    Comment: occasionally, 1-2x monthly  Drug use: Never      Today's PHQ-9         PHQ-9 Total Score:         PHQ-9 Q9 Thoughts of better off dead/self-harm past 2 weeks :       Thoughts of suicide or self harm:      Self-harm Plan:        Self-harm Action:          Safety concerns for self or others:           He eats 4 or more servings of fruits and vegetables daily.He consumes 1 sweetened beverage(s) daily.He exercises with enough effort to increase his heart rate 30 to 60 minutes per day.  He exercises with enough effort to increase his heart rate 4 days per week.   He is taking medications regularly.       Depression and Anxiety Follow-Up    How are you doing with your depression since your last visit? Improved     How are you doing with your anxiety since your last visit?  Improved     Are you having other symptoms that might be associated with depression or anxiety? No    Have you had a significant life event? No     Do you have any concerns with your use of alcohol or other drugs? No    Social History     Tobacco Use     Smoking status: Never Smoker      Smokeless tobacco: Never Used   Substance Use Topics     Alcohol use: Yes     Comment: occasionally, 1-2x monthly     Drug use: Never     No flowsheet data found.  KISHOR-7 SCORE 7/9/2021 8/9/2021 9/18/2021   Total Score 11 (moderate anxiety) 2 (minimal anxiety) 1 (minimal anxiety)   Total Score 11 2 1     No flowsheet data found.  KISHOR-7  9/18/2021   1. Feeling nervous, anxious, or on edge 0   2. Not being able to stop or control worrying 0   3. Worrying too much about different things 0   4. Trouble relaxing 0   5. Being so restless that it is hard to sit still 0   6. Becoming easily annoyed or irritable 1   7. Feeling afraid, as if something awful might happen 0   KISHOR-7 Total Score 1     Doing much better on this medication     Review of Systems   Constitutional, HEENT, cardiovascular, pulmonary, gi and gu systems are negative, except as otherwise noted.      Objective           Vitals:  No vitals were obtained today due to virtual visit.    Physical Exam   GENERAL: Healthy, alert and no distress  EYES: Eyes grossly normal to inspection and conjunctivae and sclerae normal  HENT: normal cephalic/atraumatic  RESP: No audible wheeze, cough, or visible cyanosis.  No visible retractions or increased work of breathing.    MS: extremities normal- no gross deformities noted  SKIN: Visible skin clear. No significant rash, abnormal pigmentation or lesions.  NEURO: Normal strength and tone  PSYCH: Mentation appears normal, affect normal/bright, judgement and insight intact, normal speech and appearance well-groomed.    Diagnostic Test Results:  Labs reviewed in Epic  none       Assessment & Plan     Moderately severe depression (H)  Reviewed concept of depression as function of biochemical imbalance of neurotransmitters/rationale for treatment.  Risks and benefits of medication(s) reviewed with patient.  Questions answered.  Followup appointment in 6 month(s)  Patient instructed to call for significant side effects medications  or problems  Patient advised immediate presentation to hospital for suicidal thought, etc.    Adjustment disorder with anxious mood  .Continue current medications as prescribed.   Follow up in 6 months   - busPIRone (BUSPAR) 10 MG tablet  Dispense: 180 tablet; Refill: 3      See Patient Instructions  Patient Instructions     PLAN:   1.   Symptomatic therapy suggested: continue on Buspar.  2.  Orders Placed This Encounter   Medications     busPIRone (BUSPAR) 10 MG tablet     Sig: Take 1 tablet (10 mg) by mouth 2 times daily     Dispense:  180 tablet     Refill:  3     3. Patient needs to follow up in if no improvement,or sooner if worsening of symptoms or other symptoms develop.  Follow up in 6 months.    No follow-ups on file.    ARLINE Merchant CNP  [facility]          Video-Visit Details    Type of service:  Video Visit    Video End Time:5:30 PM    Originating Location (pt. Location): Home    Distant Location (provider location):  [facility]     Platform used for Video Visit: PiCloud

## 2021-10-11 ENCOUNTER — HEALTH MAINTENANCE LETTER (OUTPATIENT)
Age: 26
End: 2021-10-11

## 2022-05-22 ENCOUNTER — HEALTH MAINTENANCE LETTER (OUTPATIENT)
Age: 27
End: 2022-05-22

## 2022-09-25 ENCOUNTER — HEALTH MAINTENANCE LETTER (OUTPATIENT)
Age: 27
End: 2022-09-25

## 2023-06-04 ENCOUNTER — HEALTH MAINTENANCE LETTER (OUTPATIENT)
Age: 28
End: 2023-06-04

## 2024-07-20 ENCOUNTER — HEALTH MAINTENANCE LETTER (OUTPATIENT)
Age: 29
End: 2024-07-20